# Patient Record
Sex: MALE | Race: WHITE | Employment: FULL TIME | URBAN - METROPOLITAN AREA
[De-identification: names, ages, dates, MRNs, and addresses within clinical notes are randomized per-mention and may not be internally consistent; named-entity substitution may affect disease eponyms.]

---

## 2023-09-12 ENCOUNTER — OFFICE VISIT (OUTPATIENT)
Dept: FAMILY MEDICINE CLINIC | Age: 53
End: 2023-09-12

## 2023-09-12 VITALS
HEART RATE: 73 BPM | BODY MASS INDEX: 23.07 KG/M2 | WEIGHT: 147 LBS | TEMPERATURE: 98.1 F | HEIGHT: 67 IN | SYSTOLIC BLOOD PRESSURE: 100 MMHG | OXYGEN SATURATION: 98 % | DIASTOLIC BLOOD PRESSURE: 70 MMHG

## 2023-09-12 DIAGNOSIS — H65.93 MIDDLE EAR EFFUSION, BILATERAL: ICD-10-CM

## 2023-09-12 DIAGNOSIS — G89.29 CHRONIC FOOT PAIN, LEFT: ICD-10-CM

## 2023-09-12 DIAGNOSIS — M79.672 CHRONIC FOOT PAIN, LEFT: ICD-10-CM

## 2023-09-12 DIAGNOSIS — G89.29 CHRONIC RIGHT SHOULDER PAIN: Primary | ICD-10-CM

## 2023-09-12 DIAGNOSIS — M25.511 CHRONIC RIGHT SHOULDER PAIN: Primary | ICD-10-CM

## 2023-09-12 DIAGNOSIS — D48.5 NEOPLASM OF UNCERTAIN BEHAVIOR OF SKIN OF ABDOMEN: ICD-10-CM

## 2023-09-12 RX ORDER — DIPHENHYDRAMINE HCL 25 MG
25 CAPSULE ORAL NIGHTLY PRN
COMMUNITY

## 2023-09-12 SDOH — ECONOMIC STABILITY: HOUSING INSECURITY
IN THE LAST 12 MONTHS, WAS THERE A TIME WHEN YOU DID NOT HAVE A STEADY PLACE TO SLEEP OR SLEPT IN A SHELTER (INCLUDING NOW)?: NO

## 2023-09-12 SDOH — ECONOMIC STABILITY: FOOD INSECURITY: WITHIN THE PAST 12 MONTHS, THE FOOD YOU BOUGHT JUST DIDN'T LAST AND YOU DIDN'T HAVE MONEY TO GET MORE.: NEVER TRUE

## 2023-09-12 SDOH — ECONOMIC STABILITY: INCOME INSECURITY: HOW HARD IS IT FOR YOU TO PAY FOR THE VERY BASICS LIKE FOOD, HOUSING, MEDICAL CARE, AND HEATING?: NOT HARD AT ALL

## 2023-09-12 SDOH — ECONOMIC STABILITY: FOOD INSECURITY: WITHIN THE PAST 12 MONTHS, YOU WORRIED THAT YOUR FOOD WOULD RUN OUT BEFORE YOU GOT MONEY TO BUY MORE.: NEVER TRUE

## 2023-09-12 ASSESSMENT — PATIENT HEALTH QUESTIONNAIRE - PHQ9
SUM OF ALL RESPONSES TO PHQ9 QUESTIONS 1 & 2: 0
1. LITTLE INTEREST OR PLEASURE IN DOING THINGS: 0
2. FEELING DOWN, DEPRESSED OR HOPELESS: 0
SUM OF ALL RESPONSES TO PHQ QUESTIONS 1-9: 0

## 2023-09-12 NOTE — PROGRESS NOTES
Subjective  Juanis Carpenter 1970 is a 48 y.o. male who presents today with:  Chief Complaint   Patient presents with    Otalgia     C/O bilateral ear pressure for the past month. States he was sick a few weeks ago and evaluated at [address] [place] [address]. He was prescribed a Z-kwadwo and prednisone at that time, however he continues to have pressure in his ears. Denies fever, chills, sore throat, or sinus congestion. Skin Lesion     C/O skin lesion on his abdomen. Noticed this a few weeks ago and it continues to grow in size. It has started to catch on his clothing. Denies bleeding. Other     He has [facility] paperwork with him today that he would like to discuss. Foot Pain     C/O left foot pain for the past few months. Denies injury. Pain is located in the arch of his foot. Health Maintenance     He is due for yearly labs. Pasha completed a few years ago at [facility]. I have reviewed HPI and agree with above. The shoulder pain is being chronic. 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 He has not had any x-rays. The pain is a constant ache to the anterior shoulder and into the anterior chest wall and through to his scapula. He thinks this is service related. Review of Systems   HENT:  Positive for ear pain. History reviewed. No pertinent past medical history. History reviewed. No pertinent surgical history.   Social History     Socioeconomic History    Marital status:      Spouse name: Not on file    Number of children: Not on file    Years of education: Not on file    Highest education level: Not on file   Occupational History    Not on file   Tobacco Use    Smoking status: Never    Smokeless tobacco: Never   Vaping Use    Vaping Use: Never used   Substance and Sexual Activity    Alcohol use: Never    Drug use: Never    Sexual activity: Not on file   Other Topics Concern    Not on

## 2023-11-13 ENCOUNTER — TELEPHONE (OUTPATIENT)
Dept: FAMILY MEDICINE CLINIC | Age: 53
End: 2023-11-13

## 2023-11-13 DIAGNOSIS — H91.8X3 OTHER SPECIFIED HEARING LOSS OF BOTH EARS: Primary | ICD-10-CM

## 2023-11-13 NOTE — TELEPHONE ENCOUNTER
Patient calling in would like a referral for a hearing test   Asking to send referral somewhere close to [facility]    Please call [phone]

## 2023-12-13 DIAGNOSIS — G89.29 CHRONIC RIGHT SHOULDER PAIN: Primary | ICD-10-CM

## 2023-12-13 DIAGNOSIS — H91.8X3 OTHER SPECIFIED HEARING LOSS OF BOTH EARS: ICD-10-CM

## 2023-12-13 DIAGNOSIS — M25.511 CHRONIC RIGHT SHOULDER PAIN: Primary | ICD-10-CM

## 2023-12-29 ENCOUNTER — TELEPHONE (OUTPATIENT)
Dept: FAMILY MEDICINE CLINIC | Age: 53
End: 2023-12-29

## 2023-12-29 NOTE — TELEPHONE ENCOUNTER
Patient calling in has not gotten this done yet  Patient is needing to know know what to do he needs to know what to do because his VA Paper is due as well    Patient order- [place] PHYSICAL PERFORMANCE TEST/ALVINA W/REPRT EA 15 MIN     Says completed but patient says it was never done.    Please [phone]

## 2024-01-02 DIAGNOSIS — M25.511 CHRONIC RIGHT SHOULDER PAIN: Primary | ICD-10-CM

## 2024-01-02 DIAGNOSIS — G89.29 CHRONIC RIGHT SHOULDER PAIN: Primary | ICD-10-CM

## 2024-01-02 DIAGNOSIS — Z00.8 ENCOUNTER FOR WORK CAPABILITY ASSESSMENT: Primary | ICD-10-CM

## 2024-01-14 ENCOUNTER — PATIENT MESSAGE (OUTPATIENT)
Dept: FAMILY MEDICINE CLINIC | Age: 54
End: 2024-01-14

## 2024-01-15 NOTE — TELEPHONE ENCOUNTER
From: Bulmaro Hills  To: Dr. Broderick Salinas  Sent: 1/14/2024 8:42 PM EST  Subject: 2 months later with no follow up    I have been calling to attempt to get the paperwork completed for my VA benefits. I was told that a range of motion test would be scheduled and still have not heard back. I need the paperwork completed and submitted to the VA office by the end of Jan. Just getting frustrated with the breakdown of communication, I don't know what's going on .

## 2024-01-15 NOTE — TELEPHONE ENCOUNTER
Therapy returned call. Patient is scheduled 01/23 at 3:30 for FCE at [facility]. Patient informed and verbalized a clear understanding.

## 2024-01-23 ENCOUNTER — HOSPITAL ENCOUNTER (OUTPATIENT)
Dept: PHYSICAL THERAPY | Age: 54
Setting detail: THERAPIES SERIES
Discharge: HOME | End: 2024-01-23
Attending: FAMILY MEDICINE
Payer: COMMERCIAL

## 2024-01-23 PROCEDURE — 97750 PHYSICAL PERFORMANCE TEST: CPT

## 2024-01-23 NOTE — PLAN OF CARE
demonstrates normal functional capabilities as noted above. 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     Recommendations: Continue to follow up with MD for management of chronic pain.      PT Individual Minutes  Time In: 1523  Time Out: 1630  Minutes: 67  Timed Code Treatment Minutes: 67 Minutes    Plan of Care:  Goals Current/ Discharge status Status   STG 1: Complete FCE to determine abilities FCE completed this date Met      PLAN:  Discharge, Recommend pt follow up with MD.     Electronically signed by Brea Cortez PT on 1/23/24 at 3:26 PM EST  Therapist Signature    [facility]     __________________________  Physician Signature

## 2024-01-30 NOTE — TELEPHONE ENCOUNTER
Patient calling in, he is still waiting to get the DVQ paperwork completed for VA benefits. (He has been calling and trying to get this completed since first of the month and this is due tomorrow)      Please call patient at [phone]

## 2024-04-03 ENCOUNTER — OFFICE VISIT (OUTPATIENT)
Dept: FAMILY MEDICINE CLINIC | Age: 54
End: 2024-04-03
Payer: COMMERCIAL

## 2024-04-03 VITALS
OXYGEN SATURATION: 98 % | HEART RATE: 64 BPM | SYSTOLIC BLOOD PRESSURE: 120 MMHG | TEMPERATURE: 98.1 F | WEIGHT: 151.4 LBS | BODY MASS INDEX: 23.76 KG/M2 | HEIGHT: 67 IN | DIASTOLIC BLOOD PRESSURE: 78 MMHG

## 2024-04-03 DIAGNOSIS — M54.2 CHRONIC NECK PAIN WITH NORMAL NEUROLOGICAL EXAMINATION: ICD-10-CM

## 2024-04-03 DIAGNOSIS — H65.493 CHRONIC MIDDLE EAR EFFUSION, BILATERAL: Primary | ICD-10-CM

## 2024-04-03 DIAGNOSIS — G89.29 CHRONIC NECK PAIN WITH NORMAL NEUROLOGICAL EXAMINATION: ICD-10-CM

## 2024-04-03 PROCEDURE — 99213 OFFICE O/P EST LOW 20 MIN: CPT | Performed by: FAMILY MEDICINE

## 2024-04-03 RX ORDER — CELECOXIB 200 MG/1
200 CAPSULE ORAL DAILY
Qty: 30 CAPSULE | Refills: 2 | Status: SHIPPED | OUTPATIENT
Start: 2024-04-03

## 2024-04-03 ASSESSMENT — PATIENT HEALTH QUESTIONNAIRE - PHQ9
SUM OF ALL RESPONSES TO PHQ QUESTIONS 1-9: 0
1. LITTLE INTEREST OR PLEASURE IN DOING THINGS: NOT AT ALL
2. FEELING DOWN, DEPRESSED OR HOPELESS: NOT AT ALL
SUM OF ALL RESPONSES TO PHQ QUESTIONS 1-9: 0
SUM OF ALL RESPONSES TO PHQ9 QUESTIONS 1 & 2: 0

## 2024-04-03 NOTE — PROGRESS NOTES
Subjective  Bulmaro Hills 1970 is a 54 y.o. male who presents today with:  Chief Complaint   Patient presents with    Ear Fullness     C/O left ear pressure and drainage for the past 2-3 days.     Arthritis     Also states the pain in his neck and back is progressively getting worse and he would like to discuss options for pain management.        Ear Fullness       I have reviewed HPI and agree with above.  He has a long history of significant allergies.  Recurrent sinus infections.  His ears have always bothered him.  It is started to affect his hearing.  No fever or chills.    Chronic history of neck and back pain.  He has been to rheumatology.  He is just looking for ways to manage the pain.    Review of Systems   All other systems reviewed and are negative.      History reviewed. No pertinent past medical history.  History reviewed. No pertinent surgical history.  Social History     Socioeconomic History    Marital status:      Spouse name: Not on file    Number of children: Not on file    Years of education: Not on file    Highest education level: Not on file   Occupational History    Not on file   Tobacco Use    Smoking status: Never    Smokeless tobacco: Never   Vaping Use    Vaping Use: Never used   Substance and Sexual Activity    Alcohol use: Never    Drug use: Never    Sexual activity: Not on file   Other Topics Concern    Not on file   Social History Narrative    Not on file     Social Determinants of Health     Financial Resource Strain: Low Risk  (9/12/2023)    Overall Financial Resource Strain (CARDIA)     Difficulty of Paying Living Expenses: Not hard at all   Food Insecurity: Not on file (9/12/2023)   Transportation Needs: Unknown (9/12/2023)    PRAPARE - Transportation     Lack of Transportation (Medical): Not on file     Lack of Transportation (Non-Medical): No   Physical Activity: Not on file   Stress: Not on file   Social Connections: Not on file   Intimate Partner Violence:

## 2024-04-29 ENCOUNTER — OFFICE VISIT (OUTPATIENT)
Age: 54
End: 2024-04-29
Payer: COMMERCIAL

## 2024-04-29 VITALS
OXYGEN SATURATION: 96 % | HEART RATE: 92 BPM | WEIGHT: 150 LBS | DIASTOLIC BLOOD PRESSURE: 80 MMHG | SYSTOLIC BLOOD PRESSURE: 120 MMHG | BODY MASS INDEX: 23.49 KG/M2

## 2024-04-29 DIAGNOSIS — H90.3 SENSORINEURAL HEARING LOSS (SNHL) OF BOTH EARS: ICD-10-CM

## 2024-04-29 DIAGNOSIS — H93.8X3 EAR FULLNESS, BILATERAL: Primary | ICD-10-CM

## 2024-04-29 DIAGNOSIS — R09.82 POSTNASAL DRIP: ICD-10-CM

## 2024-04-29 DIAGNOSIS — R09.A2 GLOBUS SENSATION: ICD-10-CM

## 2024-04-29 PROCEDURE — 99204 OFFICE O/P NEW MOD 45 MIN: CPT | Performed by: STUDENT IN AN ORGANIZED HEALTH CARE EDUCATION/TRAINING PROGRAM

## 2024-04-29 PROCEDURE — 92504 EAR MICROSCOPY EXAMINATION: CPT | Performed by: STUDENT IN AN ORGANIZED HEALTH CARE EDUCATION/TRAINING PROGRAM

## 2024-04-29 RX ORDER — CETIRIZINE HYDROCHLORIDE 10 MG/1
10 TABLET ORAL DAILY
Qty: 90 TABLET | Refills: 0 | Status: SHIPPED | OUTPATIENT
Start: 2024-04-29

## 2024-04-29 RX ORDER — FLUTICASONE PROPIONATE 50 MCG
2 SPRAY, SUSPENSION (ML) NASAL DAILY
Qty: 16 G | Refills: 3 | Status: SHIPPED | OUTPATIENT
Start: 2024-04-29

## 2024-04-29 RX ORDER — PANTOPRAZOLE SODIUM 40 MG/1
40 TABLET, DELAYED RELEASE ORAL
Qty: 90 TABLET | Refills: 3 | Status: SHIPPED | OUTPATIENT
Start: 2024-04-29

## 2024-04-29 NOTE — PATIENT INSTRUCTIONS
You likely have a condition called laryngopharyngeal reflux (LPR), also known as silent reflux. The measures recommended below may take up to 4 weeks before you notice an effect. Detailed LPR information below.    _______________________________________________________________________________________________     Laryngopharyngeal Reflux (LPR) or Silent Reflux  A common cause of hoarseness or voice changes is gastroesophageal reflux disease (GERD). GERD occurs when stomach acid flows back up into the esophagus (swallowing tube). When the acid reaches the throat, it is called laryngopharyngeal reflux (LPR) or silent reflux. It is called \"silent\" because many people with LPR have no heartburn or stomach upset.    Possible Signs and Symptoms:  Hoarseness, cough  Sensation of lump in the throat, Vocal fold swelling and irritation  Frequent throat clearing, Vocal fold lesions or ulcerations  Mucous sticking in the throat, Worsening of asthma  Low grade sore throat, Worsening of sinus drainage or post nasal drip     Lifestyle changes  If you are overweight, talk with your health care provider about losing weight.  If you smoke, quit! Your health care provider may have ideas to help you quit.     Dietary guidelines  Eat smaller, more frequent meals rather than large one.   Avoid food or liquids for 2-3 hours before lying down (no bedtime snacks!)     Avoid or limit the following:  Caffeinated products: coffee, tea, sodas, chocolate  Red sauces and salsa  Fatty, fried, or greasy foods  Citric juices: orange, grapefruit  Spicy foods  Mints: peppermint, spearmint  Alcohol  Nighttime snacks  Avoid non-steroidal anti-inflammatory drugs (NSAIDs) such as Motrin, Aleve, Naprosyn, Mobic, ibuprofen, or diclofenac. If you must take an NSAID for pain, please take it after a meal.    Physical measures  Elevate the head of the bed 6 inches by placing blocks or thick books under the legs of the head of the bed. Using extra pillows is NOT

## 2024-04-29 NOTE — PROGRESS NOTES
Barometric pressure     Ear problems started after  ( 1995 )   Feels pressure in ears from fluid   \"Bad allergies\"   Takes Benadryl daily     Moderate to severe hearing loss   \"Working with [facility]\"     Spring and Fall     Choking with eating-- little piece (wheat cereal). Coughing, 10-12 yrs  Cautious with what he eats   Globus sensation     Main Symptoms:  Patient does not have anterior nasal drainage.     Patient has  posterior nasal drainage.    Patient does not have nasal airway obstruction.   Patient does not have  facial pain.    Patient has  facial pressure.  Intermittent cheeks, around eyes. Ear pressure lasts for a couple days (periods of months)   Patient does not have decreased sense of smell.  Associated Symptoms:   Patient has  headaches.    Patient has throat clearing.    Patient does not have coughing.    Patient does not have dysphonia.   Patient has sneezing.   Patient has itchy eyes.   Patient does not have nasal bleeding.      Medications currently on for sinonasal symptoms:     Medications tried in the past for sinonasal symptoms: flonase (1 spray twice daily for a week),        Other Pertinent Medical Conditions:   Patient does not have asthma.    Patient does not have migraines.    Patient does not have history of allergy testing.   Patient does not have history of sinus surgery.    Patient has history of nasal fracture.  7-8 yrs old   Patient has heartburn.    The patient is not on medical therapy for heartburn. was on pantoprazole, pepcid as needed   The patient has not had a GI evaluation.    The patient has not had imaging of sinuses.        
diphenhydrAMINE (BENADRYL) 25 MG capsule, Take 1 capsule by mouth nightly as needed for Allergies, Disp: , Rfl:    History reviewed. No pertinent past medical history.  History reviewed. No pertinent surgical history.  History reviewed. No pertinent family history.  Social History     Tobacco Use    Smoking status: Never    Smokeless tobacco: Never   Substance Use Topics    Alcohol use: Never       PMH, Surgical Hx, Family Hx, and Social Hx reviewed and updated.    Objective     Vitals:    04/29/24 0806   BP: 120/80   Site: Left Upper Arm   Position: Sitting   Cuff Size: Large Adult   Pulse: 92   SpO2: 96%   Weight: 68 kg (150 lb)        Physical Exam  Vitals reviewed.   Constitutional:       Appearance: Normal appearance.   HENT:      Head: Normocephalic and atraumatic.      Salivary Glands: Right salivary gland is not diffusely enlarged. Left salivary gland is not diffusely enlarged.      Right Ear: Tympanic membrane, ear canal and external ear normal. No drainage.      Left Ear: Tympanic membrane, ear canal and external ear normal. No drainage.      Ears:      Comments: Bilateral ears were examined using binocular microscopy.  using the microscope and an appropriate sized ear speculum, the right ear was examined.  The EAC and TM are normal to otoscopy.  There is no evidence of middle ear effusion, retraction, perforation.  TM mobility was normal.  The same procedure was performed on the contralateral side.  Using microscope and appropriately sized ear speculum, the left ear was examined.  The EAC was normal to otoscopy.  The TM was normal to otoscopy there is no evidence of middle ear effusion, retraction, perforation.  The TM mobility was normal.     Nose: No nasal deformity, septal deviation, mucosal edema or rhinorrhea.      Mouth/Throat:      Lips: Pink.      Mouth: Mucous membranes are moist. No oral lesions.      Tongue: No lesions.      Palate: No mass and lesions.      Pharynx: Oropharynx is clear. Uvula

## 2024-05-28 NOTE — TELEPHONE ENCOUNTER
Rx requested:  Requested Prescriptions     Pending Prescriptions Disp Refills    fluticasone (FLONASE) 50 MCG/ACT nasal spray [Pharmacy Med Name: FLUTICASONE PROP 50 MCG SPRAY] 1 each 2     Sig: SPRAY 2 SPRAYS INTO EACH NOSTRIL EVERY DAY       Last Office Visit:   4/29/2024      Next Visit Date:  Future Appointments   Date Time Provider Department Center   5/30/2024  3:30 PM Jen Burden MD [facility]

## 2024-05-29 RX ORDER — FLUTICASONE PROPIONATE 50 MCG
2 SPRAY, SUSPENSION (ML) NASAL DAILY
Qty: 1 EACH | Refills: 2 | Status: SHIPPED | OUTPATIENT
Start: 2024-05-29

## 2024-08-05 RX ORDER — CETIRIZINE HYDROCHLORIDE 10 MG/1
10 TABLET ORAL DAILY
Qty: 90 TABLET | Refills: 3 | Status: SHIPPED | OUTPATIENT
Start: 2024-08-05

## 2024-09-26 RX ORDER — METHYLPREDNISOLONE 4 MG
TABLET, DOSE PACK ORAL
Qty: 21 TABLET | Refills: 0 | Status: SHIPPED | OUTPATIENT
Start: 2024-09-26 | End: 2024-10-02

## 2024-09-26 RX ORDER — DOXYCYCLINE HYCLATE 100 MG
100 TABLET ORAL 2 TIMES DAILY
Qty: 20 TABLET | Refills: 0 | Status: SHIPPED | OUTPATIENT
Start: 2024-09-26 | End: 2024-10-06

## 2025-03-10 LAB
NON-UH HIE ALANINE AMINOTRANSFERASE:CCNC:PT:SER/PLAS:QN:NO ADDITION OF P-5': 38 INT._UNIT/L (ref 6–46)
NON-UH HIE ALBUMIN/GLOBULIN:MCRTO:PT:SER:QN:: 1.8 (ref 1.1–2.2)
NON-UH HIE ALBUMIN:MCNC:PT:SER/PLAS:QN:: 5 GM/DL (ref 3.3–5)
NON-UH HIE ALKALINE PHOSPHATASE:CCNC:PT:SER/PLAS:QN:: 80 INT._UNIT/L (ref 21–98)
NON-UH HIE ANION GAP:SCNC:PT:SER/PLAS:QN:: 18 MEQ/L (ref 6–16)
NON-UH HIE ASPARTATE AMINOTRANSFERASE:CCNC:PT:SER/PLAS:QN:: 33 INT._UNIT/L (ref 5–43)
NON-UH HIE BASOPHILS/LEUKOCYTES:NFR.DF:PT:BLD:QN:AUTOMATED COUNT: 0.1 E9/L (ref 0–0.2)
NON-UH HIE BASOPHILS:NCNC:PT:BLD:QN:AUTOMATED COUNT: 0.5 % (ref 0–2)
NON-UH HIE BILIRUBIN.GLUCURONIDATED+BILIRUBIN.ALBUMIN BOUND:MCNC:PT:SER/PLA: 0.2 MG/DL (ref 0–0.4)
NON-UH HIE BILIRUBIN.NON-GLUCURONIDATED:MSCNC:PT:SER/PLAS:QN:: 1.1 MG/DL (ref 0.1–0.9)
NON-UH HIE BILIRUBIN:MCNC:PT:SER/PLAS:QN:: 1.3 MG/DL (ref 0–1.1)
NON-UH HIE CALCIUM:MCNC:PT:SER/PLAS:QN:: 9.9 MG/DL (ref 8.9–11.1)
NON-UH HIE CARBON DIOXIDE:SCNC:PT:SER/PLAS:QN:: 21 MMOL/L (ref 21–31)
NON-UH HIE CHLORIDE:SCNC:PT:SER/PLAS:QN:: 103 MMOL/L (ref 101–111)
NON-UH HIE CREATININE:MCNC:PT:SER/PLAS:QN:: 1 MG/DL (ref 0.5–1.3)
NON-UH HIE EGFR: 89 ML/MIN/1.73 M2
NON-UH HIE EOSINOPHILS/100 LEUKOCYTES:NFR:PT:BLD:QN:AUTOMATED COUNT: 0.2 % (ref 0–8)
NON-UH HIE EOSINOPHILS:NCNC:PT:BLD:QN:: 0 E9/L (ref 0–0.5)
NON-UH HIE ERYTHROCYTE DISTRIBUTION WIDTH:RATIO:PT:RBC:QN:AUTOMATED COUNT: 13.4 % (ref 10.9–14.2)
NON-UH HIE ERYTHROCYTE MEAN CORPUSCULAR HEMOGLOBIN CONCENTRATION:MCNC:PT:RB: 34.8 GM/DL (ref 31.4–36)
NON-UH HIE ERYTHROCYTE MEAN CORPUSCULAR HEMOGLOBIN:ENTMASS:PT:RBC:QN:AUTOMA: 31 PG (ref 27–34)
NON-UH HIE ERYTHROCYTE MEAN CORPUSCULAR VOLUME:ENTVOL:PT:RBC:QN:AUTOMATED C: 89.1 FL (ref 80–100)
NON-UH HIE ERYTHROCYTES:NCNC:PT:BLD:QN:AUTOMATED COUNT: 5.2 E12/L (ref 4.3–5.9)
NON-UH HIE GLOBULIN:MCNC:PT:SER:QN:CALCULATED: 2.8 GM/DL (ref 1.4–4)
NON-UH HIE GLUCOSE:MCNC:PT:SER/PLAS:QN:: 132 MG/DL (ref 55–199)
NON-UH HIE HEMATOCRIT:VFR:PT:BLD:QN:AUTOMATED COUNT: 45.9 % (ref 37.7–49)
NON-UH HIE HEMOGLOBIN:MCNC:PT:BLD:QN:: 16 GM/DL (ref 13.5–17.5)
NON-UH HIE LEUKOCYTES: 20.1 E9/L (ref 4–11)
NON-UH HIE LYMPHOCYTES:NCNC:PT:BLD:QN:: 0.2 E9/L (ref 1–4)
NON-UH HIE LYMPHOCYTES:NCNC:PT:BLD:QN:AUTOMATED COUNT: 1.1 % (ref 14–50)
NON-UH HIE MONOCYTES:NCNC:PT:BLD:QN:AUTOMATED COUNT: 1 E9/L (ref 0.2–1)
NON-UH HIE NEUTROPHILS/100 LEUKOCYTES:NFR:PT:BLD:QN:: 93.1 % (ref 36–75)
NON-UH HIE NEUTROPHILS:NCNC:PT:BLD:QN:AUTOMATED COUNT: 18.7 E9/L (ref 2–7.5)
NON-UH HIE PLATELET MEAN VOLUME:ENTVOL:PT:BLD:QN:AUTOMATED COUNT: 7.9 FL (ref 6.4–10.8)
NON-UH HIE PLATELETS:NCNC:PT:BLD:QN:AUTOMATED COUNT: 243 E9/L (ref 150–500)
NON-UH HIE POTASSIUM:SCNC:PT:SER/PLAS:QN:: 4.1 MMOL/L (ref 3.5–5.3)
NON-UH HIE PROTEIN:MCNC:PT:SER/PLAS:QN:: 7.8 GM/DL (ref 6–7.8)
NON-UH HIE SODIUM:SCNC:PT:SER/PLAS:QN:: 138 MMOL/L (ref 135–145)
NON-UH HIE TRIACYLGLYCEROL LIPASE:CCNC:PT:SER/PLAS:QN:: 43 UNIT/L (ref 13–58)
NON-UH HIE UREA NITROGEN/CREATININE:MRTO:PT:SER/PLAS:QN:: 18 NO UNITS (ref 10–20)
NON-UH HIE UREA NITROGEN:MCNC:PT:SER/PLAS:QN:: 18 MG/DL (ref 5–21)

## 2025-03-11 DIAGNOSIS — I10 HYPERTENSION, UNSPECIFIED TYPE: Primary | ICD-10-CM

## 2025-03-11 LAB
ALBUMIN: 5 G/DL
ALP BLD-CCNC: 80 U/L
ALT SERPL-CCNC: 38 U/L
ANION GAP SERPL CALCULATED.3IONS-SCNC: 18 MMOL/L
AST SERPL-CCNC: 33 U/L
BASOPHILS ABSOLUTE: 0.1 /ΜL
BASOPHILS RELATIVE PERCENT: 0.5 %
BILIRUB SERPL-MCNC: 1.3 MG/DL (ref 0.1–1.4)
BUN BLDV-MCNC: 18 MG/DL
CALCIUM SERPL-MCNC: 9.9 MG/DL
CHLORIDE BLD-SCNC: 103 MMOL/L
CO2: 21 MMOL/L
CREAT SERPL-MCNC: 1 MG/DL
EOSINOPHILS ABSOLUTE: 0 /ΜL
EOSINOPHILS RELATIVE PERCENT: 0.2 %
GFR, ESTIMATED: 89
GLUCOSE BLD-MCNC: 132 MG/DL
HCT VFR BLD CALC: 45.9 % (ref 41–53)
HEMOGLOBIN: 16 G/DL (ref 13.5–17.5)
LYMPHOCYTES ABSOLUTE: 0.2 /ΜL
LYMPHOCYTES RELATIVE PERCENT: 1.1 %
MCH RBC QN AUTO: 31 PG
MCHC RBC AUTO-ENTMCNC: 34.8 G/DL
MCV RBC AUTO: 89.1 FL
MONOCYTES ABSOLUTE: 1 /ΜL
MONOCYTES RELATIVE PERCENT: 5.1 %
NEUTROPHILS ABSOLUTE: 18.7 /ΜL
NEUTROPHILS RELATIVE PERCENT: 93.1 %
PDW BLD-RTO: 13.4 %
PLATELET # BLD: 243 K/ΜL
PMV BLD AUTO: 7.9 FL
POTASSIUM SERPL-SCNC: 4.1 MMOL/L
RBC # BLD: 5.2 10^6/ΜL
SODIUM BLD-SCNC: 138 MMOL/L
TOTAL PROTEIN: 7.8 G/DL (ref 6.4–8.2)
WBC # BLD: 20.1 10^3/ML

## 2025-04-29 ENCOUNTER — PATIENT MESSAGE (OUTPATIENT)
Age: 55
End: 2025-04-29

## 2025-04-29 RX ORDER — METHYLPREDNISOLONE 4 MG/1
TABLET ORAL
Qty: 1 KIT | Refills: 0 | Status: SHIPPED | OUTPATIENT
Start: 2025-04-29

## 2025-04-29 RX ORDER — SULFAMETHOXAZOLE AND TRIMETHOPRIM 800; 160 MG/1; MG/1
1 TABLET ORAL 2 TIMES DAILY
Qty: 20 TABLET | Refills: 0 | Status: SHIPPED | OUTPATIENT
Start: 2025-04-29 | End: 2025-05-09

## 2025-07-11 RX ORDER — PANTOPRAZOLE SODIUM 40 MG/1
40 TABLET, DELAYED RELEASE ORAL
Qty: 90 TABLET | Refills: 3 | Status: SHIPPED | OUTPATIENT
Start: 2025-07-11

## 2025-08-25 RX ORDER — METHYLPREDNISOLONE 4 MG/1
TABLET ORAL
Qty: 1 KIT | Refills: 0 | Status: SHIPPED | OUTPATIENT
Start: 2025-08-25